# Patient Record
Sex: FEMALE | Race: WHITE | HISPANIC OR LATINO | URBAN - METROPOLITAN AREA
[De-identification: names, ages, dates, MRNs, and addresses within clinical notes are randomized per-mention and may not be internally consistent; named-entity substitution may affect disease eponyms.]

---

## 2017-04-16 ENCOUNTER — EMERGENCY (EMERGENCY)
Facility: HOSPITAL | Age: 32
LOS: 1 days | Discharge: HOME | End: 2017-04-16
Attending: EMERGENCY MEDICINE
Payer: COMMERCIAL

## 2017-04-16 VITALS
OXYGEN SATURATION: 100 % | SYSTOLIC BLOOD PRESSURE: 138 MMHG | DIASTOLIC BLOOD PRESSURE: 88 MMHG | WEIGHT: 210.1 LBS | TEMPERATURE: 99 F | HEART RATE: 80 BPM | RESPIRATION RATE: 16 BRPM | HEIGHT: 65 IN

## 2017-04-16 DIAGNOSIS — Z88.6 ALLERGY STATUS TO ANALGESIC AGENT: ICD-10-CM

## 2017-04-16 DIAGNOSIS — R10.32 LEFT LOWER QUADRANT PAIN: ICD-10-CM

## 2017-04-16 DIAGNOSIS — Z91.018 ALLERGY TO OTHER FOODS: ICD-10-CM

## 2017-04-16 LAB
ALBUMIN SERPL ELPH-MCNC: 3.9 G/DL — SIGNIFICANT CHANGE UP (ref 3.3–5.2)
ALP SERPL-CCNC: 112 U/L — SIGNIFICANT CHANGE UP (ref 40–120)
ALT FLD-CCNC: 14 U/L — SIGNIFICANT CHANGE UP
ANION GAP SERPL CALC-SCNC: 13 MMOL/L — SIGNIFICANT CHANGE UP (ref 5–17)
APPEARANCE UR: CLEAR — SIGNIFICANT CHANGE UP
AST SERPL-CCNC: 18 U/L — SIGNIFICANT CHANGE UP
BACTERIA # UR AUTO: NEGATIVE — SIGNIFICANT CHANGE UP
BASOPHILS # BLD AUTO: 0 K/UL — SIGNIFICANT CHANGE UP (ref 0–0.2)
BASOPHILS NFR BLD AUTO: 0.3 % — SIGNIFICANT CHANGE UP (ref 0–2)
BILIRUB SERPL-MCNC: 0.2 MG/DL — LOW (ref 0.4–2)
BILIRUB UR-MCNC: NEGATIVE — SIGNIFICANT CHANGE UP
BUN SERPL-MCNC: 10 MG/DL — SIGNIFICANT CHANGE UP (ref 8–20)
CALCIUM SERPL-MCNC: 8.8 MG/DL — SIGNIFICANT CHANGE UP (ref 8.6–10.2)
CHLORIDE SERPL-SCNC: 103 MMOL/L — SIGNIFICANT CHANGE UP (ref 98–107)
CO2 SERPL-SCNC: 25 MMOL/L — SIGNIFICANT CHANGE UP (ref 22–29)
COLOR SPEC: YELLOW — SIGNIFICANT CHANGE UP
CREAT SERPL-MCNC: 0.84 MG/DL — SIGNIFICANT CHANGE UP (ref 0.5–1.3)
DIFF PNL FLD: NEGATIVE — SIGNIFICANT CHANGE UP
EOSINOPHIL # BLD AUTO: 0.1 K/UL — SIGNIFICANT CHANGE UP (ref 0–0.5)
EOSINOPHIL NFR BLD AUTO: 1 % — SIGNIFICANT CHANGE UP (ref 0–6)
EPI CELLS # UR: SIGNIFICANT CHANGE UP
GLUCOSE SERPL-MCNC: 92 MG/DL — SIGNIFICANT CHANGE UP (ref 70–115)
GLUCOSE UR QL: NEGATIVE MG/DL — SIGNIFICANT CHANGE UP
HCT VFR BLD CALC: 34.7 % — LOW (ref 37–47)
HGB BLD-MCNC: 11.8 G/DL — LOW (ref 12–16)
KETONES UR-MCNC: NEGATIVE — SIGNIFICANT CHANGE UP
LEUKOCYTE ESTERASE UR-ACNC: NEGATIVE — SIGNIFICANT CHANGE UP
LIDOCAIN IGE QN: 28 U/L — SIGNIFICANT CHANGE UP (ref 22–51)
LYMPHOCYTES # BLD AUTO: 2.3 K/UL — SIGNIFICANT CHANGE UP (ref 1–4.8)
LYMPHOCYTES # BLD AUTO: 25.9 % — SIGNIFICANT CHANGE UP (ref 20–55)
MCHC RBC-ENTMCNC: 31.6 PG — HIGH (ref 27–31)
MCHC RBC-ENTMCNC: 34 G/DL — SIGNIFICANT CHANGE UP (ref 32–36)
MCV RBC AUTO: 92.8 FL — SIGNIFICANT CHANGE UP (ref 81–99)
MONOCYTES # BLD AUTO: 0.7 K/UL — SIGNIFICANT CHANGE UP (ref 0–0.8)
MONOCYTES NFR BLD AUTO: 8.4 % — SIGNIFICANT CHANGE UP (ref 3–10)
NEUTROPHILS # BLD AUTO: 5.7 K/UL — SIGNIFICANT CHANGE UP (ref 1.8–8)
NEUTROPHILS NFR BLD AUTO: 64.2 % — SIGNIFICANT CHANGE UP (ref 37–73)
NITRITE UR-MCNC: NEGATIVE — SIGNIFICANT CHANGE UP
PH UR: 7 — SIGNIFICANT CHANGE UP (ref 4.8–8)
PLATELET # BLD AUTO: 348 K/UL — SIGNIFICANT CHANGE UP (ref 150–400)
POTASSIUM SERPL-MCNC: 3.6 MMOL/L — SIGNIFICANT CHANGE UP (ref 3.5–5.3)
POTASSIUM SERPL-SCNC: 3.6 MMOL/L — SIGNIFICANT CHANGE UP (ref 3.5–5.3)
PROT SERPL-MCNC: 7.4 G/DL — SIGNIFICANT CHANGE UP (ref 6.6–8.7)
PROT UR-MCNC: 15 MG/DL
RBC # BLD: 3.74 M/UL — LOW (ref 4.4–5.2)
RBC # FLD: 13 % — SIGNIFICANT CHANGE UP (ref 11–15.6)
RBC CASTS # UR COMP ASSIST: SIGNIFICANT CHANGE UP /HPF (ref 0–4)
SODIUM SERPL-SCNC: 141 MMOL/L — SIGNIFICANT CHANGE UP (ref 135–145)
SP GR SPEC: 1.01 — SIGNIFICANT CHANGE UP (ref 1.01–1.02)
UROBILINOGEN FLD QL: 1 MG/DL
WBC # BLD: 8.8 K/UL — SIGNIFICANT CHANGE UP (ref 4.8–10.8)
WBC # FLD AUTO: 8.8 K/UL — SIGNIFICANT CHANGE UP (ref 4.8–10.8)
WBC UR QL: SIGNIFICANT CHANGE UP

## 2017-04-16 PROCEDURE — 99284 EMERGENCY DEPT VISIT MOD MDM: CPT

## 2017-04-16 RX ORDER — SODIUM CHLORIDE 9 MG/ML
3 INJECTION INTRAMUSCULAR; INTRAVENOUS; SUBCUTANEOUS EVERY 8 HOURS
Qty: 0 | Refills: 0 | Status: ACTIVE | OUTPATIENT
Start: 2017-04-16 | End: 2018-03-15

## 2017-04-16 RX ORDER — CEPHALEXIN 500 MG
1 CAPSULE ORAL
Qty: 28 | Refills: 0 | OUTPATIENT
Start: 2017-04-16 | End: 2017-04-23

## 2017-04-16 RX ADMIN — SODIUM CHLORIDE 3 MILLILITER(S): 9 INJECTION INTRAMUSCULAR; INTRAVENOUS; SUBCUTANEOUS at 23:24

## 2017-04-16 NOTE — ED STATDOCS - PROGRESS NOTE DETAILS
PA NOTE: Pt seen by intake physician and orders/plan reviewed. PT is a 30yo female with no significant pmhx presenting to ED with complaints of abdominal pain x 2 weeks. pt reports today she found a mass in her LLQ that is painful. pt reports it radiates down her leg. pt denies fever, chills, N/V/D, hx of abdominal surgery, dysuria. ALLERGY: MOTRIN, AVACADO  PE: GEN: Awake, alert,  NAD,  CARDIAC: Reg rate and rhythm, S1,S2, RRR  RESP: No distress noted. Lungs CTA bilaterally no wheeze, ronchi, rales. ABD: obese, + bsx4. soft, supple, +llq ttp no mass noted. no guarding. . NEURO: AOx3, no focal deficits   PLAN:pt is a 30yo female with LLQ mass? / pain. labs reviewed. abnormal lab reviewed. ct PA NOTE: Pt seen by intake physician and orders/plan reviewed. PT is a 30yo female with no significant pmhx presenting to ED with complaints of abdominal pain x 2 weeks. pt reports today she found a mass in her LLQ that is painful. pt reports it radiates down her leg. pt denies fever, chills, N/V/D, hx of abdominal surgery, dysuria. ALLERGY: MOTRIN, AVACADO  PE: GEN: Awake, alert,  NAD,  CARDIAC: Reg rate and rhythm, S1,S2, RRR  RESP: No distress noted. Lungs CTA bilaterally no wheeze, ronchi, rales. ABD: obese, + bsx4. soft, supple, +llq ttp no mass noted. no guarding. . NEURO: AOx3, no focal deficits   PLAN:pt is a 30yo female with LLQ mass? / pain. labs reviewed. abnormal lab reviewed. ct reviewed. pt given copy of results. pt advised to follow up with PMD this week. pt verbalized understanding and agreement with plan and dx will dc.

## 2017-04-16 NOTE — ED ADULT NURSE NOTE - OBJECTIVE STATEMENT
Pt. presents to ED with c/o LLQ abdominal pain for past month. Pain radiates down to left leg and to lower back.

## 2017-04-16 NOTE — ED STATDOCS - ATTENDING CONTRIBUTION TO CARE
I, Lexa Parekh, performed the initial face to face bedside interview with this patient regarding history of present illness, review of symptoms and relevant past medical, social and family history.  I completed an independent physical examination.  I was the initial provider who evaluated this patient. I have signed out the follow up of any pending tests (i.e. labs, radiological studies) to the ACP.  I have communicated the patient’s plan of care and disposition with the ACP.  The history, relevant review of systems, past medical and surgical history, medical decision making, and physical examination was documented by the scribe in my presence and I attest to the accuracy of the documentation.

## 2017-04-16 NOTE — ED STATDOCS - NS ED MD SCRIBE ATTENDING SCRIBE SECTIONS
VITAL SIGNS( Pullset)/PHYSICAL EXAM/DISPOSITION/REVIEW OF SYSTEMS/PAST MEDICAL/SURGICAL/SOCIAL HISTORY/HIV/HISTORY OF PRESENT ILLNESS

## 2017-04-16 NOTE — ED ADULT TRIAGE NOTE - CHIEF COMPLAINT QUOTE
Pt c/o LLQ abdominal pain, reports she feels a "ball" beneath her skin which is causing her to have tingling to the right upper lip.

## 2017-04-16 NOTE — ED STATDOCS - OBJECTIVE STATEMENT
32 y/o F pt with no PMHx presents to ED c/o LLQ abdominal pain x2 weeks. Pt reports she lost balance 2 weeks ago when she  found "a little ball" in her LLQ. Pt denies fever, nausea, vomiting, diarrhea, CP, and SOB. No further complaints at this time. Allergy to Motrin.  LNMP: 1 week ago

## 2017-04-17 VITALS
OXYGEN SATURATION: 99 % | DIASTOLIC BLOOD PRESSURE: 83 MMHG | RESPIRATION RATE: 16 BRPM | TEMPERATURE: 98 F | SYSTOLIC BLOOD PRESSURE: 121 MMHG | HEART RATE: 72 BPM

## 2017-04-17 PROCEDURE — 74177 CT ABD & PELVIS W/CONTRAST: CPT | Mod: 26

## 2017-04-17 PROCEDURE — 81001 URINALYSIS AUTO W/SCOPE: CPT

## 2017-04-17 PROCEDURE — 99284 EMERGENCY DEPT VISIT MOD MDM: CPT | Mod: 25

## 2017-04-17 PROCEDURE — 83690 ASSAY OF LIPASE: CPT

## 2017-04-17 PROCEDURE — 80053 COMPREHEN METABOLIC PANEL: CPT

## 2017-04-17 PROCEDURE — 84702 CHORIONIC GONADOTROPIN TEST: CPT

## 2017-04-17 PROCEDURE — 74177 CT ABD & PELVIS W/CONTRAST: CPT

## 2017-04-17 PROCEDURE — 85027 COMPLETE CBC AUTOMATED: CPT

## 2017-06-22 ENCOUNTER — EMERGENCY (EMERGENCY)
Facility: HOSPITAL | Age: 32
LOS: 1 days | Discharge: HOME | End: 2017-06-22
Attending: EMERGENCY MEDICINE
Payer: COMMERCIAL

## 2017-06-22 VITALS
HEIGHT: 64 IN | WEIGHT: 225.09 LBS | OXYGEN SATURATION: 100 % | TEMPERATURE: 98 F | SYSTOLIC BLOOD PRESSURE: 140 MMHG | DIASTOLIC BLOOD PRESSURE: 90 MMHG | RESPIRATION RATE: 29 BRPM | HEART RATE: 78 BPM

## 2017-06-22 PROCEDURE — 99285 EMERGENCY DEPT VISIT HI MDM: CPT

## 2017-06-23 VITALS
RESPIRATION RATE: 20 BRPM | SYSTOLIC BLOOD PRESSURE: 122 MMHG | HEART RATE: 80 BPM | OXYGEN SATURATION: 100 % | DIASTOLIC BLOOD PRESSURE: 72 MMHG | TEMPERATURE: 98 F

## 2017-06-23 LAB
ALBUMIN SERPL ELPH-MCNC: 3.8 G/DL — SIGNIFICANT CHANGE UP (ref 3.3–5.2)
ALP SERPL-CCNC: 117 U/L — SIGNIFICANT CHANGE UP (ref 40–120)
ALT FLD-CCNC: 12 U/L — SIGNIFICANT CHANGE UP
ANION GAP SERPL CALC-SCNC: 14 MMOL/L — SIGNIFICANT CHANGE UP (ref 5–17)
AST SERPL-CCNC: 22 U/L — SIGNIFICANT CHANGE UP
BASOPHILS # BLD AUTO: 0 K/UL — SIGNIFICANT CHANGE UP (ref 0–0.2)
BASOPHILS NFR BLD AUTO: 0.3 % — SIGNIFICANT CHANGE UP (ref 0–2)
BILIRUB SERPL-MCNC: 0.2 MG/DL — LOW (ref 0.4–2)
BUN SERPL-MCNC: 8 MG/DL — SIGNIFICANT CHANGE UP (ref 8–20)
CALCIUM SERPL-MCNC: 8.2 MG/DL — LOW (ref 8.6–10.2)
CHLORIDE SERPL-SCNC: 102 MMOL/L — SIGNIFICANT CHANGE UP (ref 98–107)
CO2 SERPL-SCNC: 21 MMOL/L — LOW (ref 22–29)
CREAT SERPL-MCNC: 0.63 MG/DL — SIGNIFICANT CHANGE UP (ref 0.5–1.3)
EOSINOPHIL # BLD AUTO: 0.1 K/UL — SIGNIFICANT CHANGE UP (ref 0–0.5)
EOSINOPHIL NFR BLD AUTO: 1.2 % — SIGNIFICANT CHANGE UP (ref 0–6)
GLUCOSE SERPL-MCNC: 92 MG/DL — SIGNIFICANT CHANGE UP (ref 70–115)
HCG SERPL-ACNC: <2 MIU/ML — SIGNIFICANT CHANGE UP
HCG UR QL: NEGATIVE — SIGNIFICANT CHANGE UP
HCT VFR BLD CALC: 34.8 % — LOW (ref 37–47)
HGB BLD-MCNC: 12.1 G/DL — SIGNIFICANT CHANGE UP (ref 12–16)
INR BLD: 1 RATIO — SIGNIFICANT CHANGE UP (ref 0.88–1.16)
LYMPHOCYTES # BLD AUTO: 2.5 K/UL — SIGNIFICANT CHANGE UP (ref 1–4.8)
LYMPHOCYTES # BLD AUTO: 28.5 % — SIGNIFICANT CHANGE UP (ref 20–55)
MAGNESIUM SERPL-MCNC: 2 MG/DL — SIGNIFICANT CHANGE UP (ref 1.6–2.6)
MCHC RBC-ENTMCNC: 32 PG — HIGH (ref 27–31)
MCHC RBC-ENTMCNC: 34.8 G/DL — SIGNIFICANT CHANGE UP (ref 32–36)
MCV RBC AUTO: 92.1 FL — SIGNIFICANT CHANGE UP (ref 81–99)
MONOCYTES # BLD AUTO: 0.6 K/UL — SIGNIFICANT CHANGE UP (ref 0–0.8)
MONOCYTES NFR BLD AUTO: 7.2 % — SIGNIFICANT CHANGE UP (ref 3–10)
NEUTROPHILS # BLD AUTO: 5.4 K/UL — SIGNIFICANT CHANGE UP (ref 1.8–8)
NEUTROPHILS NFR BLD AUTO: 62.6 % — SIGNIFICANT CHANGE UP (ref 37–73)
PLATELET # BLD AUTO: 313 K/UL — SIGNIFICANT CHANGE UP (ref 150–400)
POTASSIUM SERPL-MCNC: 3.9 MMOL/L — SIGNIFICANT CHANGE UP (ref 3.5–5.3)
POTASSIUM SERPL-SCNC: 3.9 MMOL/L — SIGNIFICANT CHANGE UP (ref 3.5–5.3)
PROT SERPL-MCNC: 7.3 G/DL — SIGNIFICANT CHANGE UP (ref 6.6–8.7)
PROTHROM AB SERPL-ACNC: 11 SEC — SIGNIFICANT CHANGE UP (ref 9.8–12.7)
RBC # BLD: 3.78 M/UL — LOW (ref 4.4–5.2)
RBC # FLD: 12.6 % — SIGNIFICANT CHANGE UP (ref 11–15.6)
SODIUM SERPL-SCNC: 137 MMOL/L — SIGNIFICANT CHANGE UP (ref 135–145)
TROPONIN T SERPL-MCNC: <0.01 NG/ML — SIGNIFICANT CHANGE UP (ref 0–0.06)
WBC # BLD: 8.6 K/UL — SIGNIFICANT CHANGE UP (ref 4.8–10.8)
WBC # FLD AUTO: 8.6 K/UL — SIGNIFICANT CHANGE UP (ref 4.8–10.8)

## 2017-06-23 PROCEDURE — 93010 ELECTROCARDIOGRAM REPORT: CPT

## 2017-06-23 PROCEDURE — 36415 COLL VENOUS BLD VENIPUNCTURE: CPT

## 2017-06-23 PROCEDURE — 85610 PROTHROMBIN TIME: CPT

## 2017-06-23 PROCEDURE — 71046 X-RAY EXAM CHEST 2 VIEWS: CPT

## 2017-06-23 PROCEDURE — 80053 COMPREHEN METABOLIC PANEL: CPT

## 2017-06-23 PROCEDURE — 71020: CPT | Mod: 26

## 2017-06-23 PROCEDURE — 83735 ASSAY OF MAGNESIUM: CPT

## 2017-06-23 PROCEDURE — 84702 CHORIONIC GONADOTROPIN TEST: CPT

## 2017-06-23 PROCEDURE — 93005 ELECTROCARDIOGRAM TRACING: CPT

## 2017-06-23 PROCEDURE — 99284 EMERGENCY DEPT VISIT MOD MDM: CPT | Mod: 25

## 2017-06-23 PROCEDURE — 85027 COMPLETE CBC AUTOMATED: CPT

## 2017-06-23 PROCEDURE — 81025 URINE PREGNANCY TEST: CPT

## 2017-06-23 PROCEDURE — 84484 ASSAY OF TROPONIN QUANT: CPT

## 2017-06-23 RX ORDER — SODIUM CHLORIDE 9 MG/ML
2000 INJECTION INTRAMUSCULAR; INTRAVENOUS; SUBCUTANEOUS ONCE
Qty: 0 | Refills: 0 | Status: COMPLETED | OUTPATIENT
Start: 2017-06-23 | End: 2017-06-23

## 2017-06-23 RX ORDER — SODIUM CHLORIDE 9 MG/ML
3 INJECTION INTRAMUSCULAR; INTRAVENOUS; SUBCUTANEOUS ONCE
Qty: 0 | Refills: 0 | Status: COMPLETED | OUTPATIENT
Start: 2017-06-23 | End: 2017-06-23

## 2017-06-23 RX ADMIN — SODIUM CHLORIDE 3 MILLILITER(S): 9 INJECTION INTRAMUSCULAR; INTRAVENOUS; SUBCUTANEOUS at 01:45

## 2017-06-23 RX ADMIN — SODIUM CHLORIDE 2000 MILLILITER(S): 9 INJECTION INTRAMUSCULAR; INTRAVENOUS; SUBCUTANEOUS at 01:45

## 2017-06-23 NOTE — ED PROVIDER NOTE - OBJECTIVE STATEMENT
33 y/o F presents to the ED c/o syncopal episode that occurred earlier today. Pt states she was speaking to someone when suddenly her vision blackened and she fell to the floor. Afterwards, pt reports feeling very cold. Pt endorses some dizziness and SOB, however sx have significantly improved in ED. She also reports experiencing some chest pain/pressure alongside her sx. Denies fever, abd pain, HA, dysuria. She remarks that she has never experienced similar sx in the past. No further complaints at this time.

## 2017-06-23 NOTE — ED ADULT NURSE NOTE - OBJECTIVE STATEMENT
rcd pt on stretcher, in no apparent distress, awake alert/orientedx3, states she was at [employer] when rcd pt on stretcher, in no apparent distress, awake alert/orientedx3, states she was at [employer] when she "blacked out" lost balance had to "grab onto others" so she wouldn't fall, she was lowered to the ground

## 2018-04-23 NOTE — ED ADULT TRIAGE NOTE - LOCATION:
Subjective   Domonique Rubio is a 64 y.o. female.     History of Present Illness   Domonique Rubio 64 y.o. female who presents for f/u evaluation of acute bronchitis. She states she has not noticed any improvement on prednisone.  She reports increased wheezing.      The following portions of the patient's history were reviewed and updated as appropriate: allergies, current medications, past family history, past medical history, past social history, past surgical history and problem list.    Review of Systems   Constitutional: Negative for chills and fever.   HENT: Negative for congestion, sinus pain and sinus pressure.    Respiratory: Positive for cough, chest tightness, shortness of breath and wheezing.        Objective   Physical Exam   Constitutional: She is oriented to person, place, and time. She appears well-developed and well-nourished.   Cardiovascular: Normal rate and regular rhythm.    Pulmonary/Chest: Effort normal. She has wheezes in the right upper field, the right middle field, the right lower field, the left upper field and the left lower field.   Neurological: She is alert and oriented to person, place, and time.   Psychiatric: She has a normal mood and affect. Judgment normal.   Nursing note and vitals reviewed.      Assessment/Plan   Domonique was seen today for bronchitis and shortness of breath.    Diagnoses and all orders for this visit:    Bronchitis  -     azithromycin (ZITHROMAX Z-WILLIAM) 250 MG tablet; Take 2 tablets the first day, then 1 tablet daily for 4 days.  -     albuterol (PROVENTIL HFA;VENTOLIN HFA) 108 (90 Base) MCG/ACT inhaler; Inhale 2 puffs Every 4 (Four) Hours As Needed for Wheezing or Shortness of Air.                Left arm; Right arm;

## 2018-05-07 ENCOUNTER — EMERGENCY (EMERGENCY)
Facility: HOSPITAL | Age: 33
LOS: 1 days | Discharge: HOME | End: 2018-05-07
Attending: EMERGENCY MEDICINE
Payer: SELF-PAY

## 2018-05-07 VITALS
RESPIRATION RATE: 16 BRPM | SYSTOLIC BLOOD PRESSURE: 136 MMHG | TEMPERATURE: 98 F | HEART RATE: 82 BPM | DIASTOLIC BLOOD PRESSURE: 78 MMHG | WEIGHT: 229.94 LBS | OXYGEN SATURATION: 97 % | HEIGHT: 63 IN

## 2018-05-07 PROCEDURE — 99284 EMERGENCY DEPT VISIT MOD MDM: CPT | Mod: 25

## 2018-05-07 PROCEDURE — 96374 THER/PROPH/DIAG INJ IV PUSH: CPT

## 2018-05-07 PROCEDURE — 99053 MED SERV 10PM-8AM 24 HR FAC: CPT

## 2018-05-07 RX ORDER — METHOCARBAMOL 500 MG/1
1 TABLET, FILM COATED ORAL
Qty: 20 | Refills: 0 | OUTPATIENT
Start: 2018-05-07 | End: 2018-05-11

## 2018-05-07 RX ORDER — METHOCARBAMOL 500 MG/1
1 TABLET, FILM COATED ORAL
Qty: 20 | Refills: 0
Start: 2018-05-07 | End: 2018-05-11

## 2018-05-07 RX ORDER — KETOROLAC TROMETHAMINE 30 MG/ML
15 SYRINGE (ML) INJECTION ONCE
Qty: 0 | Refills: 0 | Status: DISCONTINUED | OUTPATIENT
Start: 2018-05-07 | End: 2018-05-07

## 2018-05-07 RX ORDER — METHOCARBAMOL 500 MG/1
1000 TABLET, FILM COATED ORAL ONCE
Qty: 0 | Refills: 0 | Status: COMPLETED | OUTPATIENT
Start: 2018-05-07 | End: 2018-05-07

## 2018-05-07 RX ADMIN — Medication 15 MILLIGRAM(S): at 08:00

## 2018-05-07 RX ADMIN — METHOCARBAMOL 1000 MILLIGRAM(S): 500 TABLET, FILM COATED ORAL at 08:00

## 2018-05-07 NOTE — ED STATDOCS - PLAN OF CARE
Apply ice or heat (your preference) to affected area for 15-20 minutes every few hours for the next few days.  Use as much or as frequently as desired. take tyelnol extra strength 2 tablets every 4 hours as needed for aches and pains.  Take robaxin every 6 hours as needed for muscle tightness/ spasm.  Avoid heavy lifting and strenuous activity until aches and pains are improved.  Return immediately to the ER for re-evaluation if your symptoms intensify or if need symptoms (not currently present) develop. Otherwise, follow-up with your doctor in 2-3 days for re-examination. Apply ice or heat (your preference) to affected area for 15-20 minutes every few hours for the next few days.  Use as much or as frequently as desired. take tyelnol extra strength 2 tablets every 4 hours as needed for aches and pains.  Take robaxin every 6 hours as needed for muscle tightness/ spasm.  Avoid heavy lifting and strenuous activity until aches and pains are improved.  Return immediately to the ER for re-evaluation if your symptoms intensify or if need symptoms (not currently present) develop. Otherwise, follow-up with ortho spine in 1-2 weeks for re-examination.
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
## 2018-05-07 NOTE — ED STATDOCS - CARE PLAN
Principal Discharge DX:	Chronic neck pain  Assessment and plan of treatment:	Apply ice or heat (your preference) to affected area for 15-20 minutes every few hours for the next few days.  Use as much or as frequently as desired. take tyelnol extra strength 2 tablets every 4 hours as needed for aches and pains.  Take robaxin every 6 hours as needed for muscle tightness/ spasm.  Avoid heavy lifting and strenuous activity until aches and pains are improved.  Return immediately to the ER for re-evaluation if your symptoms intensify or if need symptoms (not currently present) develop. Otherwise, follow-up with your doctor in 2-3 days for re-examination. Principal Discharge DX:	Chronic neck pain  Assessment and plan of treatment:	Apply ice or heat (your preference) to affected area for 15-20 minutes every few hours for the next few days.  Use as much or as frequently as desired. take tyelnol extra strength 2 tablets every 4 hours as needed for aches and pains.  Take robaxin every 6 hours as needed for muscle tightness/ spasm.  Avoid heavy lifting and strenuous activity until aches and pains are improved.  Return immediately to the ER for re-evaluation if your symptoms intensify or if need symptoms (not currently present) develop. Otherwise, follow-up with ortho spine in 1-2 weeks for re-examination.

## 2018-05-07 NOTE — ED STATDOCS - PROGRESS NOTE DETAILS
called Stand-up MRI in [place]: awaiting imaging report MRI results received and reviewed: buldging disc at C5-6 and C6-7.  Anticipatory guidance provided
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
## 2018-10-17 NOTE — ED STATDOCS - RESPIRATORY [-], MLM
-Pr Dr. Hidalgo, Ms. Lowe has been called with her recent lab results & recommendations.  Continue her current medications and follow-up as planned or sooner if any problems.    Script Sent to [place]      ---- Message from Tiffanie Hidalgo MD sent at 10/17/2018  9:54 AM CDT -----  Does have UTI, send prescription for macrobid 100mg bid for 7 days, hold doxycycline and restart after finished macrobid    
no exertional dyspnea

## 2021-01-27 ENCOUNTER — RESULT REVIEW (OUTPATIENT)
Age: 36
End: 2021-01-27

## 2021-09-24 ENCOUNTER — EMERGENCY (EMERGENCY)
Facility: HOSPITAL | Age: 36
LOS: 1 days | Discharge: HOME | End: 2021-09-24
Attending: EMERGENCY MEDICINE
Payer: COMMERCIAL

## 2021-09-24 VITALS
HEIGHT: 63 IN | RESPIRATION RATE: 18 BRPM | OXYGEN SATURATION: 100 % | DIASTOLIC BLOOD PRESSURE: 79 MMHG | TEMPERATURE: 98 F | SYSTOLIC BLOOD PRESSURE: 124 MMHG | WEIGHT: 177.91 LBS | HEART RATE: 58 BPM

## 2021-09-24 PROCEDURE — 99284 EMERGENCY DEPT VISIT MOD MDM: CPT

## 2021-09-25 PROCEDURE — 99284 EMERGENCY DEPT VISIT MOD MDM: CPT

## 2021-09-25 RX ORDER — FAMOTIDINE 10 MG/ML
20 INJECTION INTRAVENOUS ONCE
Refills: 0 | Status: COMPLETED | OUTPATIENT
Start: 2021-09-25 | End: 2021-09-25

## 2021-09-25 RX ORDER — DIPHENHYDRAMINE HCL 50 MG
1 CAPSULE ORAL
Qty: 20 | Refills: 0
Start: 2021-09-25 | End: 2021-09-29

## 2021-09-25 RX ORDER — DIPHENHYDRAMINE HCL 50 MG
50 CAPSULE ORAL ONCE
Refills: 0 | Status: COMPLETED | OUTPATIENT
Start: 2021-09-25 | End: 2021-09-25

## 2021-09-25 RX ORDER — FAMOTIDINE 10 MG/ML
1 INJECTION INTRAVENOUS
Qty: 5 | Refills: 0
Start: 2021-09-25 | End: 2021-09-29

## 2021-09-25 RX ADMIN — FAMOTIDINE 20 MILLIGRAM(S): 10 INJECTION INTRAVENOUS at 01:31

## 2021-09-25 RX ADMIN — Medication 40 MILLIGRAM(S): at 01:31

## 2021-09-25 RX ADMIN — Medication 50 MILLIGRAM(S): at 01:31

## 2021-09-25 NOTE — ED PROVIDER NOTE - ATTENDING CONTRIBUTION TO CARE
I, Mary Fleming, performed a face to face bedside interview with this patient regarding history of present illness, review of symptoms and relevant past medical, social and family history.  I completed an independent physical examination. Medical decision making, follow-up on ordered tests (ie labs, radiologic studies) and re-evaluation of the patient's status has been communicated to the ACP.  Disposition of the patient will be based on test outcome and response to ED interventions.     Pt with allergic reaction, no criteria for anaphylaxis. tx and dc

## 2021-09-25 NOTE — ED PROVIDER NOTE - CHPI ED SYMPTOMS NEG
no cough/no difficulty swallowing/no nausea/no rash/no shortness of breath/no vomiting/no wheezing/no difficulty breathing

## 2021-09-25 NOTE — ED PROVIDER NOTE - OBJECTIVE STATEMENT
35 y/o female with no sign medical history presents ot the ED c/o generalized itching and swelling to the face. Notes last night she ate chicken and woke up with swelling of her face and itching. did not take any medication for the swelling. No hx of allergies, Notes shes eaten that same chicken in the past without issues. Denies n/v, drooling, swelling of the tongue lip, sob, difficulty breathing.

## 2021-09-25 NOTE — ED PROVIDER NOTE - PATIENT PORTAL LINK FT
You can access the FollowMyHealth Patient Portal offered by [facility] by registering at the following website: [email or web address]. By joining [facility]’s FollowMyHealth portal, you will also be able to view your health information using other applications (apps) compatible with our system.

## 2021-09-25 NOTE — ED PROVIDER NOTE - CLINICAL SUMMARY MEDICAL DECISION MAKING FREE TEXT BOX
35 y/o female with no sign medical history presents ot the ED c/o generalized itching and swelling to the face. no obvious swelling, no drooling, no enlarged tongue, airway patent, no evidence of anaphalaxis, meds, f/u with allergist

## 2021-09-25 NOTE — ED PROVIDER NOTE - ENMT, MLM
Airway patent, Nasal mucosa clear. Mouth with normal mucosa. Throat has no vesicles, no oropharyngeal exudates and uvula is midline. no swelling appreciated of face, no swelling of the tongue, lips, patent airway

## 2021-11-21 ENCOUNTER — RESULT REVIEW (OUTPATIENT)
Age: 36
End: 2021-11-21

## 2022-01-19 NOTE — ED ADULT NURSE NOTE - AGGRAVATING FACTORS
SW Discharge Planning     SW noted baby's cordstat to be negative for all tested substances       Electronically signed by ASIYA Ivey on 1/19/2022 at 1:26 PM movement

## 2022-06-08 NOTE — ED ADULT NURSE NOTE - NEURO ASSESSMENT
Patients father called into the office , he is having car troubles and needs to reschedule appt for today with Dr Lovelace       Please call him back @: [phone]
- - -

## 2022-11-09 NOTE — ED PROVIDER NOTE - PRINCIPAL DIAGNOSIS
Syncope, unspecified syncope type Interpolation Flap Text: A decision was made to reconstruct the defect utilizing an interpolation axial flap and a staged reconstruction.  A telfa template was made of the defect.  This telfa template was then used to outline the interpolation flap.  The donor area for the pedicle flap was then injected with anesthesia.  The flap was excised through the skin and subcutaneous tissue down to the layer of the underlying musculature.  The interpolation flap was carefully excised within this deep plane to maintain its blood supply.  The edges of the donor site were undermined.   The donor site was closed in a primary fashion.  The pedicle was then rotated into position and sutured.  Once the tube was sutured into place, adequate blood supply was confirmed with blanching and refill.  The pedicle was then wrapped with xeroform gauze and dressed appropriately with a telfa and gauze bandage to ensure continued blood supply and protect the attached pedicle.

## 2023-07-27 NOTE — ED STATDOCS - NS ED PATIENT SAFETY CONCERN
FOUND SOME DRIBBLE OF BLOOD MOST LIKELY FROM RESIDUAL BLOOD ACCUMULATION. BLOOD 
FLOW HAS SEIZED. AT THIS TIME. No

## 2023-10-23 ENCOUNTER — APPOINTMENT (OUTPATIENT)
Dept: UROGYNECOLOGY | Facility: CLINIC | Age: 38
End: 2023-10-23
Payer: COMMERCIAL

## 2023-10-23 DIAGNOSIS — R35.1 NOCTURIA: ICD-10-CM

## 2023-10-23 DIAGNOSIS — R35.0 FREQUENCY OF MICTURITION: ICD-10-CM

## 2023-10-23 PROCEDURE — 51798 US URINE CAPACITY MEASURE: CPT

## 2023-10-23 PROCEDURE — 81003 URINALYSIS AUTO W/O SCOPE: CPT | Mod: QW

## 2023-10-23 PROCEDURE — 99204 OFFICE O/P NEW MOD 45 MIN: CPT

## 2023-10-24 LAB
APPEARANCE: ABNORMAL
BACTERIA: NEGATIVE /HPF
BILIRUBIN URINE: NEGATIVE
BLOOD URINE: ABNORMAL
CALCIUM OXALATE CRYSTALS: PRESENT
CAST: 1 /LPF
COLOR: NORMAL
EPITHELIAL CELLS: 5 /HPF
GLUCOSE QUALITATIVE U: NEGATIVE MG/DL
KETONES URINE: ABNORMAL MG/DL
LEUKOCYTE ESTERASE URINE: ABNORMAL
MICROSCOPIC-UA: NORMAL
NITRITE URINE: NEGATIVE
PH URINE: 6
PROTEIN URINE: NORMAL MG/DL
RED BLOOD CELLS URINE: 1 /HPF
REVIEW: NORMAL
SPECIFIC GRAVITY URINE: >1.03
UROBILINOGEN URINE: 0.2 MG/DL
WHITE BLOOD CELLS URINE: 1 /HPF

## 2023-10-26 LAB — BACTERIA UR CULT: NORMAL

## 2023-11-01 LAB
MYCOPLASMA HOMINIS CULTURE: NEGATIVE
UREAPLASMA CULTURE: NEGATIVE

## 2023-12-18 ENCOUNTER — APPOINTMENT (OUTPATIENT)
Dept: UROGYNECOLOGY | Facility: CLINIC | Age: 38
End: 2023-12-18

## 2024-11-20 NOTE — ED ADULT NURSE NOTE - FALLEN IN THE PAST
Quality 397: Melanoma: Reporting: Pathology report includes the pT Category, thickness, ulceration and mitotic rate, peripheral and deep margin status and presence or absence of microsatellitosis for invasive tumors.
Quality 137: Melanoma: Continuity Of Care - Recall System: Patient information entered into a recall system that includes: target date for the next exam specified AND a process to follow up with patients regarding missed or unscheduled appointments
Detail Level: Detailed
no

## 2025-09-10 ENCOUNTER — NON-APPOINTMENT (OUTPATIENT)
Age: 40
End: 2025-09-10

## 2025-09-12 ENCOUNTER — APPOINTMENT (OUTPATIENT)
Dept: OBGYN | Facility: CLINIC | Age: 40
End: 2025-09-12
Payer: COMMERCIAL

## 2025-09-12 VITALS
WEIGHT: 147 LBS | HEIGHT: 62 IN | DIASTOLIC BLOOD PRESSURE: 70 MMHG | BODY MASS INDEX: 27.05 KG/M2 | SYSTOLIC BLOOD PRESSURE: 122 MMHG

## 2025-09-12 DIAGNOSIS — Z86.2 PERSONAL HISTORY OF DISEASES OF THE BLOOD AND BLOOD-FORMING ORGANS AND CERTAIN DISORDERS INVOLVING THE IMMUNE MECHANISM: ICD-10-CM

## 2025-09-12 DIAGNOSIS — Z83.3 FAMILY HISTORY OF DIABETES MELLITUS: ICD-10-CM

## 2025-09-12 DIAGNOSIS — Z82.49 FAMILY HISTORY OF ISCHEMIC HEART DISEASE AND OTHER DISEASES OF THE CIRCULATORY SYSTEM: ICD-10-CM

## 2025-09-12 DIAGNOSIS — Z78.9 OTHER SPECIFIED HEALTH STATUS: ICD-10-CM

## 2025-09-12 DIAGNOSIS — D25.9 LEIOMYOMA OF UTERUS, UNSPECIFIED: ICD-10-CM

## 2025-09-12 DIAGNOSIS — N94.6 DYSMENORRHEA, UNSPECIFIED: ICD-10-CM

## 2025-09-12 PROCEDURE — 99205 OFFICE O/P NEW HI 60 MIN: CPT

## 2025-09-12 RX ORDER — NORELGESTROMIN AND ETHINYL ESTRADIOL 35; 150 UG/MG; UG/MG
150-35 PATCH TRANSDERMAL
Qty: 4 | Refills: 3 | Status: ACTIVE | COMMUNITY
Start: 2025-09-12 | End: 1900-01-01

## 2025-09-12 RX ORDER — TIRZEPATIDE 10 MG/.5ML
10 INJECTION, SOLUTION SUBCUTANEOUS
Refills: 0 | Status: ACTIVE | COMMUNITY

## 2025-09-12 RX ORDER — CELECOXIB 200 MG/1
200 CAPSULE ORAL TWICE DAILY
Qty: 90 | Refills: 1 | Status: ACTIVE | COMMUNITY
Start: 2025-09-12 | End: 1900-01-01

## 2025-09-15 ENCOUNTER — NON-APPOINTMENT (OUTPATIENT)
Age: 40
End: 2025-09-15